# Patient Record
Sex: FEMALE | Race: WHITE | NOT HISPANIC OR LATINO
[De-identification: names, ages, dates, MRNs, and addresses within clinical notes are randomized per-mention and may not be internally consistent; named-entity substitution may affect disease eponyms.]

---

## 2017-03-19 ENCOUNTER — RESULT REVIEW (OUTPATIENT)
Age: 62
End: 2017-03-19

## 2017-11-28 ENCOUNTER — APPOINTMENT (OUTPATIENT)
Dept: MAMMOGRAPHY | Facility: CLINIC | Age: 62
End: 2017-11-28
Payer: COMMERCIAL

## 2017-11-28 ENCOUNTER — APPOINTMENT (OUTPATIENT)
Dept: ULTRASOUND IMAGING | Facility: CLINIC | Age: 62
End: 2017-11-28
Payer: COMMERCIAL

## 2017-11-28 ENCOUNTER — OUTPATIENT (OUTPATIENT)
Dept: OUTPATIENT SERVICES | Facility: HOSPITAL | Age: 62
LOS: 1 days | End: 2017-11-28
Payer: COMMERCIAL

## 2017-11-28 DIAGNOSIS — Z12.31 ENCOUNTER FOR SCREENING MAMMOGRAM FOR MALIGNANT NEOPLASM OF BREAST: ICD-10-CM

## 2017-11-28 PROCEDURE — G0202: CPT | Mod: 26

## 2017-11-28 PROCEDURE — 77063 BREAST TOMOSYNTHESIS BI: CPT

## 2017-11-28 PROCEDURE — 76641 ULTRASOUND BREAST COMPLETE: CPT | Mod: 26,50

## 2017-11-28 PROCEDURE — 77063 BREAST TOMOSYNTHESIS BI: CPT | Mod: 26

## 2017-11-28 PROCEDURE — 76641 ULTRASOUND BREAST COMPLETE: CPT

## 2017-11-28 PROCEDURE — 77067 SCR MAMMO BI INCL CAD: CPT

## 2018-04-12 ENCOUNTER — RESULT REVIEW (OUTPATIENT)
Age: 63
End: 2018-04-12

## 2018-11-30 ENCOUNTER — OUTPATIENT (OUTPATIENT)
Dept: OUTPATIENT SERVICES | Facility: HOSPITAL | Age: 63
LOS: 1 days | End: 2018-11-30
Payer: COMMERCIAL

## 2018-11-30 ENCOUNTER — APPOINTMENT (OUTPATIENT)
Dept: MAMMOGRAPHY | Facility: CLINIC | Age: 63
End: 2018-11-30
Payer: COMMERCIAL

## 2018-11-30 ENCOUNTER — APPOINTMENT (OUTPATIENT)
Dept: ULTRASOUND IMAGING | Facility: CLINIC | Age: 63
End: 2018-11-30
Payer: COMMERCIAL

## 2018-11-30 DIAGNOSIS — Z00.8 ENCOUNTER FOR OTHER GENERAL EXAMINATION: ICD-10-CM

## 2018-11-30 PROCEDURE — 77066 DX MAMMO INCL CAD BI: CPT | Mod: 26

## 2018-11-30 PROCEDURE — G0279: CPT | Mod: 26

## 2018-11-30 PROCEDURE — 77063 BREAST TOMOSYNTHESIS BI: CPT

## 2018-11-30 PROCEDURE — 76641 ULTRASOUND BREAST COMPLETE: CPT | Mod: 26,50

## 2018-11-30 PROCEDURE — 77067 SCR MAMMO BI INCL CAD: CPT

## 2018-11-30 PROCEDURE — 76641 ULTRASOUND BREAST COMPLETE: CPT

## 2018-11-30 PROCEDURE — 77066 DX MAMMO INCL CAD BI: CPT

## 2018-11-30 PROCEDURE — G0279: CPT

## 2019-08-29 ENCOUNTER — APPOINTMENT (OUTPATIENT)
Dept: OBGYN | Facility: CLINIC | Age: 64
End: 2019-08-29
Payer: MEDICARE

## 2019-08-29 VITALS
WEIGHT: 130 LBS | DIASTOLIC BLOOD PRESSURE: 80 MMHG | HEIGHT: 65 IN | SYSTOLIC BLOOD PRESSURE: 120 MMHG | BODY MASS INDEX: 21.66 KG/M2

## 2019-08-29 DIAGNOSIS — N94.9 UNSPECIFIED CONDITION ASSOCIATED WITH FEMALE GENITAL ORGANS AND MENSTRUAL CYCLE: ICD-10-CM

## 2019-08-29 DIAGNOSIS — L29.0 PRURITUS ANI: ICD-10-CM

## 2019-08-29 PROCEDURE — 99214 OFFICE O/P EST MOD 30 MIN: CPT

## 2019-08-29 PROCEDURE — 81003 URINALYSIS AUTO W/O SCOPE: CPT | Mod: QW

## 2019-08-29 NOTE — DISCUSSION/SUMMARY
[FreeTextEntry1] : F/U affirm cx.\par \par Will prescribe lotrisone for external vulvar and rectal itching and terconazole for internal burning.

## 2019-08-29 NOTE — REVIEW OF SYSTEMS
[Nl] : Integumentary [Chest Pain] : no chest pain [Dyspnea] : no shortness of breath [Vomiting] : no vomiting [Abdominal Pain] : no abdominal pain [Vaginal Pain] : vaginal pain [Vaginal Discharge] : no vaginal discharge [Pelvic Pain] : no pelvic pain [Vaginal Itching] : no vaginal itching [Vulvar Itching] : vulvar itching

## 2019-08-29 NOTE — PHYSICAL EXAM
[Vulvitis] : vulvitis [Labia Majora] : labia major [Labia Minora] : labia minora [Discharge] : a  ~M vaginal discharge was present [Atrophy] : atrophy [Scant] : scant [de-identified] : perirectal erythema as well [No Bleeding] : there was no active vaginal bleeding

## 2019-08-30 LAB
BILIRUB UR QL STRIP: NORMAL
GLUCOSE UR-MCNC: NORMAL
HCG UR QL: 0.2 EU/DL
HGB UR QL STRIP.AUTO: NORMAL
KETONES UR-MCNC: NORMAL
LEUKOCYTE ESTERASE UR QL STRIP: NORMAL
NITRITE UR QL STRIP: NORMAL
PH UR STRIP: 7
PROT UR STRIP-MCNC: NORMAL
SP GR UR STRIP: 1.01

## 2019-08-31 LAB
CANDIDA VAG CYTO: NOT DETECTED
G VAGINALIS+PREV SP MTYP VAG QL MICRO: NOT DETECTED
T VAGINALIS VAG QL WET PREP: NOT DETECTED

## 2019-09-19 ENCOUNTER — RECORD ABSTRACTING (OUTPATIENT)
Age: 64
End: 2019-09-19

## 2019-09-19 DIAGNOSIS — R68.82 DECREASED LIBIDO: ICD-10-CM

## 2019-09-19 DIAGNOSIS — R35.1 NOCTURIA: ICD-10-CM

## 2019-09-19 DIAGNOSIS — Z86.39 PERSONAL HISTORY OF OTHER ENDOCRINE, NUTRITIONAL AND METABOLIC DISEASE: ICD-10-CM

## 2019-09-19 DIAGNOSIS — Z78.9 OTHER SPECIFIED HEALTH STATUS: ICD-10-CM

## 2019-09-19 DIAGNOSIS — E03.9 HYPOTHYROIDISM, UNSPECIFIED: ICD-10-CM

## 2019-09-19 DIAGNOSIS — D06.9 CARCINOMA IN SITU OF CERVIX, UNSPECIFIED: ICD-10-CM

## 2019-09-19 DIAGNOSIS — F41.9 ANXIETY DISORDER, UNSPECIFIED: ICD-10-CM

## 2019-09-19 DIAGNOSIS — Z92.89 PERSONAL HISTORY OF OTHER MEDICAL TREATMENT: ICD-10-CM

## 2019-09-19 DIAGNOSIS — E78.00 PURE HYPERCHOLESTEROLEMIA, UNSPECIFIED: ICD-10-CM

## 2019-09-19 DIAGNOSIS — I10 ESSENTIAL (PRIMARY) HYPERTENSION: ICD-10-CM

## 2019-09-19 DIAGNOSIS — N64.59 OTHER SIGNS AND SYMPTOMS IN BREAST: ICD-10-CM

## 2019-09-19 DIAGNOSIS — M06.9 RHEUMATOID ARTHRITIS, UNSPECIFIED: ICD-10-CM

## 2019-09-19 DIAGNOSIS — N89.8 OTHER SPECIFIED NONINFLAMMATORY DISORDERS OF VAGINA: ICD-10-CM

## 2019-09-19 DIAGNOSIS — M85.80 OTHER SPECIFIED DISORDERS OF BONE DENSITY AND STRUCTURE, UNSPECIFIED SITE: ICD-10-CM

## 2019-09-19 DIAGNOSIS — Z87.39 PERSONAL HISTORY OF OTHER DISEASES OF THE MUSCULOSKELETAL SYSTEM AND CONNECTIVE TISSUE: ICD-10-CM

## 2019-09-19 DIAGNOSIS — Z87.448 PERSONAL HISTORY OF OTHER DISEASES OF URINARY SYSTEM: ICD-10-CM

## 2019-09-19 LAB — CYTOLOGY CVX/VAG DOC THIN PREP: NORMAL

## 2019-09-19 RX ORDER — ZOLPIDEM TARTRATE 10 MG/1
10 TABLET, FILM COATED ORAL
Refills: 0 | Status: ACTIVE | COMMUNITY

## 2019-10-16 ENCOUNTER — APPOINTMENT (OUTPATIENT)
Dept: OBGYN | Facility: CLINIC | Age: 64
End: 2019-10-16
Payer: MEDICARE

## 2019-10-16 VITALS
BODY MASS INDEX: 20.89 KG/M2 | SYSTOLIC BLOOD PRESSURE: 124 MMHG | DIASTOLIC BLOOD PRESSURE: 72 MMHG | HEIGHT: 66 IN | WEIGHT: 130 LBS

## 2019-10-16 DIAGNOSIS — Z12.31 ENCOUNTER FOR SCREENING MAMMOGRAM FOR MALIGNANT NEOPLASM OF BREAST: ICD-10-CM

## 2019-10-16 LAB
BILIRUB UR QL STRIP: NORMAL
DATE COLLECTED: NORMAL
GLUCOSE UR-MCNC: NORMAL
HCG UR QL: 0.2 EU/DL
HEMOCCULT SP1 STL QL: NEGATIVE
HGB UR QL STRIP.AUTO: NORMAL
KETONES UR-MCNC: NORMAL
LEUKOCYTE ESTERASE UR QL STRIP: NORMAL
NITRITE UR QL STRIP: NORMAL
PH UR STRIP: 5.5
PROT UR STRIP-MCNC: NORMAL
QUALITY CONTROL: NORMAL
SP GR UR STRIP: 1.01

## 2019-10-16 PROCEDURE — 81003 URINALYSIS AUTO W/O SCOPE: CPT | Mod: QW

## 2019-10-16 PROCEDURE — 82270 OCCULT BLOOD FECES: CPT

## 2019-10-16 PROCEDURE — G0101: CPT

## 2019-10-16 NOTE — REVIEW OF SYSTEMS
[Dysuria] : dysuria [Nocturia] : nocturia [Nl] : Integumentary [Fever] : no fever [Chills] : no chills [Pelvic Pain] : no pelvic pain [Abn Vag Bleeding] : no abnormal vaginal bleeding [Vulvar Itching] : no vulvar itching [FreeTextEntry2] : Vaginal Dryness

## 2019-10-16 NOTE — HISTORY OF PRESENT ILLNESS
[Last Mammogram ___] : Last Mammogram was [unfilled] [Last Bone Density ___] : Last bone density studies [unfilled] [Last Pap ___] : Last cervical pap smear was [unfilled] [unknown] : the patient is unsure of the date of her LMP [Menarche Age: ____] : age at menarche was [unfilled] [Sexually Active] : is not sexually active

## 2019-10-16 NOTE — PHYSICAL EXAM
[Awake] : awake [Alert] : alert [Breast Nipple Inversion] : inverted [Soft] : soft [Oriented x3] : oriented to person, place, and time [Normal] : external genitalia [Labia Majora] : labia major [Labia Minora] : labia minora [Atrophy] : atrophy [No Bleeding] : there was no active vaginal bleeding [Absent] : absent [No Tenderness] : no rectal tenderness [Acute Distress] : no acute distress [Mass] : no breast mass [Nipple Discharge] : no nipple discharge [Axillary LAD] : no axillary lymphadenopathy [Tender] : non tender [Distended] : not distended [Depressed Mood] : not depressed [Flat Affect] : affect not flat [Tenderness] : nontender [Adnexa Tenderness] : were not tender [Occult Blood] : occult blood test from digital rectal exam was negative

## 2019-10-17 LAB — HPV HIGH+LOW RISK DNA PNL CVX: NOT DETECTED

## 2019-10-21 LAB — CYTOLOGY CVX/VAG DOC THIN PREP: ABNORMAL

## 2020-10-06 ENCOUNTER — APPOINTMENT (OUTPATIENT)
Dept: OBGYN | Facility: CLINIC | Age: 65
End: 2020-10-06
Payer: MEDICARE

## 2020-10-06 VITALS
WEIGHT: 130 LBS | TEMPERATURE: 97.3 F | BODY MASS INDEX: 21.66 KG/M2 | SYSTOLIC BLOOD PRESSURE: 150 MMHG | DIASTOLIC BLOOD PRESSURE: 90 MMHG | HEIGHT: 65 IN

## 2020-10-06 DIAGNOSIS — R31.29 OTHER MICROSCOPIC HEMATURIA: ICD-10-CM

## 2020-10-06 PROCEDURE — 99213 OFFICE O/P EST LOW 20 MIN: CPT

## 2020-10-06 PROCEDURE — 81003 URINALYSIS AUTO W/O SCOPE: CPT | Mod: QW

## 2020-10-06 RX ORDER — TERCONAZOLE 8 MG/G
0.8 CREAM VAGINAL
Qty: 1 | Refills: 0 | Status: DISCONTINUED | COMMUNITY
Start: 2019-08-29 | End: 2020-10-06

## 2020-10-06 RX ORDER — LACTOBACILLUS COMBINATION NO.4 3B CELL
CAPSULE ORAL
Refills: 0 | Status: DISCONTINUED | COMMUNITY
End: 2020-10-06

## 2020-10-06 RX ORDER — FLAXSEED OIL 1000 MG
CAPSULE ORAL
Refills: 0 | Status: DISCONTINUED | COMMUNITY
End: 2020-10-06

## 2020-10-06 NOTE — PHYSICAL EXAM
[Appropriately responsive] : appropriately responsive [Alert] : alert [No Acute Distress] : no acute distress [No Lymphadenopathy] : no lymphadenopathy [Soft] : soft [Non-tender] : non-tender [Non-distended] : non-distended [No HSM] : No HSM [No Lesions] : no lesions [No Mass] : no mass [Oriented x3] : oriented x3 [Labia Majora] : normal [Labia Minora] : normal [Normal] : normal [Atrophy] : atrophy [Dry Mucosa] : dry mucosa [No Bleeding] : There was no active vaginal bleeding [Absent] : absent [Uterine Adnexae] : absent

## 2020-10-08 LAB
APPEARANCE: CLEAR
BACTERIA: NEGATIVE
BILIRUB UR QL STRIP: NORMAL
BILIRUBIN URINE: NEGATIVE
BLOOD URINE: NEGATIVE
COLOR: NORMAL
GLUCOSE QUALITATIVE U: NEGATIVE
GLUCOSE UR-MCNC: NORMAL
HCG UR QL: 0.2 EU/DL
HGB UR QL STRIP.AUTO: ABNORMAL
HYALINE CASTS: 0 /LPF
KETONES UR-MCNC: NORMAL
KETONES URINE: NEGATIVE
LEUKOCYTE ESTERASE UR QL STRIP: ABNORMAL
LEUKOCYTE ESTERASE URINE: NEGATIVE
MICROSCOPIC-UA: NORMAL
NITRITE UR QL STRIP: NORMAL
NITRITE URINE: NEGATIVE
PH UR STRIP: 7
PH URINE: 7
PROT UR STRIP-MCNC: NORMAL
PROTEIN URINE: NEGATIVE
RED BLOOD CELLS URINE: 0 /HPF
SP GR UR STRIP: 1.02
SPECIFIC GRAVITY URINE: 1.01
SQUAMOUS EPITHELIAL CELLS: 0 /HPF
UROBILINOGEN URINE: NORMAL
WHITE BLOOD CELLS URINE: 0 /HPF

## 2020-10-14 LAB — BACTERIA UR CULT: NORMAL

## 2020-10-20 ENCOUNTER — APPOINTMENT (OUTPATIENT)
Dept: OBGYN | Facility: CLINIC | Age: 65
End: 2020-10-20
Payer: MEDICARE

## 2020-10-20 VITALS
BODY MASS INDEX: 21.66 KG/M2 | DIASTOLIC BLOOD PRESSURE: 76 MMHG | SYSTOLIC BLOOD PRESSURE: 128 MMHG | TEMPERATURE: 97.2 F | HEIGHT: 65 IN | WEIGHT: 130 LBS

## 2020-10-20 DIAGNOSIS — Z12.31 ENCOUNTER FOR SCREENING MAMMOGRAM FOR MALIGNANT NEOPLASM OF BREAST: ICD-10-CM

## 2020-10-20 DIAGNOSIS — Z87.891 PERSONAL HISTORY OF NICOTINE DEPENDENCE: ICD-10-CM

## 2020-10-20 DIAGNOSIS — Z78.9 OTHER SPECIFIED HEALTH STATUS: ICD-10-CM

## 2020-10-20 DIAGNOSIS — Z01.419 ENCOUNTER FOR GYNECOLOGICAL EXAMINATION (GENERAL) (ROUTINE) W/OUT ABNORMAL FINDINGS: ICD-10-CM

## 2020-10-20 DIAGNOSIS — R92.2 INCONCLUSIVE MAMMOGRAM: ICD-10-CM

## 2020-10-20 LAB
BILIRUB UR QL STRIP: NORMAL
GLUCOSE UR-MCNC: NORMAL
HCG UR QL: 0.2 EU/DL
HGB UR QL STRIP.AUTO: NORMAL
KETONES UR-MCNC: NORMAL
LEUKOCYTE ESTERASE UR QL STRIP: NORMAL
NITRITE UR QL STRIP: NORMAL
PH UR STRIP: 5.5
PROT UR STRIP-MCNC: NORMAL
SP GR UR STRIP: 1.01

## 2020-10-20 PROCEDURE — G0101: CPT

## 2020-10-20 PROCEDURE — 81003 URINALYSIS AUTO W/O SCOPE: CPT | Mod: QW

## 2020-10-20 PROCEDURE — 99397 PER PM REEVAL EST PAT 65+ YR: CPT | Mod: GY

## 2020-10-20 PROCEDURE — 82270 OCCULT BLOOD FECES: CPT

## 2020-10-20 RX ORDER — CLOTRIMAZOLE AND BETAMETHASONE DIPROPIONATE 10; .5 MG/G; MG/G
1-0.05 CREAM TOPICAL TWICE DAILY
Qty: 1 | Refills: 1 | Status: DISCONTINUED | COMMUNITY
Start: 2019-08-29 | End: 2020-10-20

## 2020-10-27 PROBLEM — Z87.891 FORMER SMOKER: Status: ACTIVE | Noted: 2020-10-20

## 2020-10-27 PROBLEM — Z78.9 EXERCISES DAILY: Status: ACTIVE | Noted: 2020-10-20

## 2020-10-27 LAB
CYTOLOGY CVX/VAG DOC THIN PREP: ABNORMAL
HPV HIGH+LOW RISK DNA PNL CVX: NOT DETECTED

## 2020-10-28 NOTE — END OF VISIT
[FreeTextEntry3] : I, Melvin Renteria, acted solely as a scribe for Dr. Sierra on this date 10/20/2020.\par All medical record entries made by the Scribe were at my, Dr. Sierra's direction and personally dictated by me on  10/20/2020. I have reviewed the chart and agree that the record accurately reflects my personal performance of the history, physical exam, assessment and plan. I have also personally directed, reviewed, and agreed with the chart.\par

## 2020-10-28 NOTE — DISCUSSION/SUMMARY
[FreeTextEntry1] : - Annual well woman visit done today. Vaginal pap collected today due h/o HSIL. Patient declines STI testing.\par \par - Cystocele- Management options were reviewed includin) Expectant management, 2) Pessary, 3) Surgical intervention. She desires expectant management. Recommended that she follow up with Urology or Urogyn for urinary frequency symptoms (with negative urine culture).\par \par - Bilateral breast pain x 1 year- On exam, generalized tenderness to palpation in bilateral breasts. No signs of infection. Rx for mammogram and breast ultrasound were given. Recommended using a supportive bra, massage, NSAIDs PRN and using warm compresses. Referral for breast specialist was also given due to chronic breast pain.\par \par - Osteopenia- Patient states her last DEXA scan was in 2018. Rx DEXA ordered. She is following with Rheumatology. Recommended taking Vitamin D, Calcium, and continuing exercises.\par \par - Recommend following up with dermatology for annual skin surveillance.\par \par - Follow up in 1 year for her annual GYN exam or PRN.

## 2020-10-28 NOTE — PHYSICAL EXAM
[Appropriately responsive] : appropriately responsive [Alert] : alert [No Acute Distress] : no acute distress [Soft] : soft [Non-tender] : non-tender [Non-distended] : non-distended [No Lesions] : no lesions [Oriented x3] : oriented x3 [No Mass] : no mass [Examination Of The Breasts] : a normal appearance [No Masses] : no breast masses were palpable [Normal] : normal external genitalia [Labia Majora] : normal [Labia Minora] : normal [Atrophy] : atrophy [Cystocele] : a cystocele [Pink Rugae] : pink rugae [Absent] : absent [Uterine Adnexae] : absent [No Discharge] : no discharge [Tenderness Of The Right Breast] : tenderness [No Bleeding] : There was no active vaginal bleeding [No Tenderness] : no tenderness [Tenderness Of The Left Breast] : tenderness [FreeTextEntry4] : Vaginal cuff noted. [FreeTextEntry9] : FOBT negative

## 2020-10-28 NOTE — HISTORY OF PRESENT ILLNESS
[Patient reported PAP Smear was normal] : Patient reported PAP Smear was normal [LMP unknown] : LMP unknown [postmenopausal] : postmenopausal [N] : Patient reports normal menses [Y] : Positive pregnancy history [unknown] : Patient is unsure of the date of her LMP [Menarche Age: ____] : age at menarche was [unfilled] [No] : Patient does not have concerns regarding sex [Currently Active] : currently active [Men] : men [Condoms] : Condoms [Yes] : Yes [FreeTextEntry1] : Ms. Nicholson is presents for her annual well woman visit. \par \par She is doing well without any complaints. She denies having any abdominal pain or vaginal discharge. \par \par She has a history of hysterectomy. LMP was about 15 years ago. She denies having any postmenopausal vaginal bleeding.\par \par She desires a breast examination. She has chronic bilateral breast pain x 1 year.\par \par She has a history of osteopenia and has been recommended for Prolia injections by her rheumatologist. She is scheduled to see her rheumatologist this week. [Mammogramdate] : 11/05/2019 [TextBox_19] : B2 [BreastSonogramDate] : 11/05/2019 [TextBox_25] : B2 [PapSmeardate] : 10/16/2019 [PGHxTotal] : 2 [[facility] : 2 [[facility] : 2

## 2020-10-31 NOTE — DISCUSSION/SUMMARY
[FreeTextEntry1] : -Mild cystocele - Management options were discussed including pessary vs surgical intervention vs observation moving forward. Patient elects for observation as she is asymptomatic at this time. Contact information for urogynecology specialist given should patient consider surgical intervention in the future.\par \par -Urinalysis ordered - urine culture today significant for trace of hematuria.\par \par -Patient is due for her annual gyn exam on 10/16/20.\par \par -All questions and concerns addressed.

## 2020-10-31 NOTE — HISTORY OF PRESENT ILLNESS
[Patient reported PAP Smear was normal] : Patient reported PAP Smear was normal [LMP unknown] : LMP unknown [postmenopausal] : postmenopausal [N] : Patient is not sexually active [Y] : Positive pregnancy history [unknown] : Patient is unsure of the date of her LMP [No] : Patient does not have concerns regarding sex [Previously active] : previously active [FreeTextEntry1] : Ms. Nicholson is here for a follow up. She complaints that her bladder has been dropping for the past 2 months and is unable to move it back. She can feel it when she wipes, but states that it has not protruded out of the vagina. \par \par She reports urinary urgency and frequency, which she states is normal for her. She denies pain or known hematuria. Normal bowel movements.\par \par She has a history of hysterectomy and does not remember when her last menstrual period was. She is doing well without any complaints. She denies having any abdominal pain, vaginal bleeding, or vaginal discharge. \par \par She reports being in the hospital from July 7-10th for decreased sodium level. She was given IV fluids and followed up with her primary care. [Mammogramdate] : 11/5/2019 [TextBox_19] : B2 [BreastSonogramDate] : 11/05/2019 [TextBox_25] : B2 [PapSmeardate] : 10/16/2019 [PGHxTotal] : 2 [[facility] : 2 [[facility]] : 2

## 2020-10-31 NOTE — END OF VISIT
[FreeTextEntry3] : I, Loretta Duque, solely acted as scribe for Dr. Tessie Sierra on 10/06/2020.\par All medical entries made by the Scribe were at my, Dr. Sierra's, direction and personally dictated by me on 10/06/2020. I have reviewed the chart and agree that the record accurately reflects my personal performance of the history, physical exam, assessment and plan. I have also personally directed, reviewed, and agreed with the chart.

## 2020-12-21 PROBLEM — Z12.31 ENCOUNTER FOR SCREENING MAMMOGRAM FOR BREAST CANCER: Status: RESOLVED | Noted: 2019-10-16 | Resolved: 2020-12-21

## 2020-12-23 PROBLEM — Z01.419 ENCOUNTER FOR ANNUAL ROUTINE GYNECOLOGICAL EXAMINATION: Status: RESOLVED | Noted: 2020-10-20 | Resolved: 2020-12-23

## 2021-04-29 ENCOUNTER — APPOINTMENT (OUTPATIENT)
Dept: UROGYNECOLOGY | Facility: CLINIC | Age: 66
End: 2021-04-29

## 2021-05-13 ENCOUNTER — APPOINTMENT (OUTPATIENT)
Dept: UROGYNECOLOGY | Facility: CLINIC | Age: 66
End: 2021-05-13
Payer: MEDICARE

## 2021-05-13 ENCOUNTER — RESULT CHARGE (OUTPATIENT)
Age: 66
End: 2021-05-13

## 2021-05-13 DIAGNOSIS — R35.0 FREQUENCY OF MICTURITION: ICD-10-CM

## 2021-05-13 DIAGNOSIS — K46.9 UNSPECIFIED ABDOMINAL HERNIA W/OUT OBSTRUCTION OR GANGRENE: ICD-10-CM

## 2021-05-13 DIAGNOSIS — Z86.018 PERSONAL HISTORY OF OTHER BENIGN NEOPLASM: ICD-10-CM

## 2021-05-13 DIAGNOSIS — K59.00 CONSTIPATION, UNSPECIFIED: ICD-10-CM

## 2021-05-13 DIAGNOSIS — Z87.01 PERSONAL HISTORY OF PNEUMONIA (RECURRENT): ICD-10-CM

## 2021-05-13 DIAGNOSIS — Z87.828 PERSONAL HISTORY OF OTHER (HEALED) PHYSICAL INJURY AND TRAUMA: ICD-10-CM

## 2021-05-13 DIAGNOSIS — Z86.79 PERSONAL HISTORY OF OTHER DISEASES OF THE CIRCULATORY SYSTEM: ICD-10-CM

## 2021-05-13 DIAGNOSIS — Z84.1 FAMILY HISTORY OF DISORDERS OF KIDNEY AND URETER: ICD-10-CM

## 2021-05-13 DIAGNOSIS — Z87.09 PERSONAL HISTORY OF OTHER DISEASES OF THE RESPIRATORY SYSTEM: ICD-10-CM

## 2021-05-13 DIAGNOSIS — N36.41 HYPERMOBILITY OF URETHRA: ICD-10-CM

## 2021-05-13 DIAGNOSIS — Z60.2 PROBLEMS RELATED TO LIVING ALONE: ICD-10-CM

## 2021-05-13 DIAGNOSIS — R39.15 URGENCY OF URINATION: ICD-10-CM

## 2021-05-13 DIAGNOSIS — N81.9 FEMALE GENITAL PROLAPSE, UNSPECIFIED: ICD-10-CM

## 2021-05-13 DIAGNOSIS — R32 UNSPECIFIED URINARY INCONTINENCE: ICD-10-CM

## 2021-05-13 DIAGNOSIS — Z86.39 PERSONAL HISTORY OF OTHER ENDOCRINE, NUTRITIONAL AND METABOLIC DISEASE: ICD-10-CM

## 2021-05-13 DIAGNOSIS — Z79.52 LONG TERM (CURRENT) USE OF SYSTEMIC STEROIDS: ICD-10-CM

## 2021-05-13 LAB
BILIRUB UR QL STRIP: NEGATIVE
CLARITY UR: CLEAR
COLLECTION METHOD: NORMAL
GLUCOSE UR-MCNC: NEGATIVE
HCG UR QL: 0.2 EU/DL
HGB UR QL STRIP.AUTO: NEGATIVE
KETONES UR-MCNC: NEGATIVE
LEUKOCYTE ESTERASE UR QL STRIP: NORMAL
NITRITE UR QL STRIP: NEGATIVE
PH UR STRIP: 5.5
PROT UR STRIP-MCNC: NEGATIVE
SP GR UR STRIP: 1.01

## 2021-05-13 PROCEDURE — 99205 OFFICE O/P NEW HI 60 MIN: CPT | Mod: 25

## 2021-05-13 PROCEDURE — 51701 INSERT BLADDER CATHETER: CPT

## 2021-05-13 PROCEDURE — 81002 URINALYSIS NONAUTO W/O SCOPE: CPT

## 2021-05-13 RX ORDER — CALCIUM/MAG/VITAMIN D2/ZN/MIN
SUSPENSION, ORAL (FINAL DOSE FORM) ORAL
Refills: 0 | Status: ACTIVE | COMMUNITY

## 2021-05-13 SDOH — SOCIAL STABILITY - SOCIAL INSECURITY: PROBLEMS RELATED TO LIVING ALONE: Z60.2

## 2021-05-13 NOTE — DISCUSSION/SUMMARY
[FreeTextEntry1] : \par Zuri presents with vault prolapse, neg CST, PVR 100cc. Stage II prolapse on exam. Visual illustrations were used to demonstrate prolapse. We reviewed management options for her prolapse including: observation, pelvic floor exercises with and without physical therapy, pessary, and surgical management. We reviewed the different types of surgical procedures including abdominal, vaginal, and robotic/laparoscopic procedures. Mesh and non-mesh options were reviewed. IUGA information on prolapse was given to her. All questions were answered.\par \par [] u/a cx\par [] PFE, return in 2 months \par

## 2021-05-13 NOTE — PROCEDURE
[Straight Catheterization] : insertion of a straight catheter [Stress Incontinence] : stress incontinence [___ Fr Straight Tip] : a [unfilled] in [place] straight tip catheter [None] : there were no complications with the catheter insertion [No Complications] : no complications [Tolerated Well] : the patient tolerated the procedure well [Post procedure instructions and information given] : Post procedure instructions and information were given and reviewed with patient. [0] : 0

## 2021-05-13 NOTE — HISTORY OF PRESENT ILLNESS
[Cystocele (Obstetric)] : no [Vaginal Wall Prolapse] : no [Rectal Prolapse] : no [Unable To Restrain Bowel Movement] : no [Urinary Tract Infection] : no [x3+] : nocturia three or more  times a night [Urinary Frequency] : no [Feelings Of Urinary Urgency] : no [Pain During Urination (Dysuria)] : no [Constipation Obstructed Defecation] : moderate [Incomplete Emptying Of Stool] : no [Stool Visible Blood] : no [Pelvic Pain] : no [Vaginal Pain] : no [Vulvar Pain] : no [] : no [FreeTextEntry1] :  \par 64 y/o presents with c/o of vaginal bulge, she reports at night she feels worsening vaginal bulge, denies leakage of urine, no vaginal bleeding, reports she is unable to walk with the bulge, reports frequency, urgency, nocturia X 3, denies incomplete emptying, sometimes has intermittent stream, denies  UTIs, denies pain, reports constipation, not sexually active \par \par \par she reports major health issues- she has an issue with her sodium level, noone can give her an answer \par \par PSH: hysterectomy for ovarian cyst

## 2021-05-13 NOTE — PHYSICAL EXAM
[Chaperone Present] : A chaperone was present in the examining room during all aspects of the physical examination [No Acute Distress] : in no acute distress [Well developed] : well developed [Well Nourished] : ~L well nourished [Oriented x3] : oriented to person, place, and time [Normal Memory] : ~T memory was ~L unimpaired [Normal Mood/Affect] : mood and affect are normal [Cough] : no cough [No Edema] : ~T edema was not present [Tenderness] : ~T no ~M abdominal tenderness observed [Distended] : not distended [Scar] : a scar was noted [Inguinal LAD] : no adenopathy was noted in the inguinal lymph nodes [Warm and Dry] : was warm and dry to touch [Normal Gait] : gait was normal [Labia Majora] : were normal [Labia Minora] : were normal [Atrophy] : atrophy [No Bleeding] : there was no active vaginal bleeding [3] : 3 [Aa ____] : Aa [unfilled] [Ba ____] : Ba [unfilled] [C ____] : C [unfilled] [GH ____] : GH [unfilled] [PB ____] : PB [unfilled] [TVL ____] : TVL  [unfilled] [Ap ____] : Ap [unfilled] [Bp ____] : Bp [unfilled] [D ____] : D [unfilled] [Absent] : absent [Normal] : no abnormalities [Post Void Residual ____ml] : post void residual was [unfilled] ml [FreeTextEntry3] : neg CST

## 2021-05-14 LAB
APPEARANCE: CLEAR
BACTERIA: NEGATIVE
BILIRUBIN URINE: NEGATIVE
BLOOD URINE: NEGATIVE
COLOR: NORMAL
GLUCOSE QUALITATIVE U: NEGATIVE
HYALINE CASTS: 2 /LPF
KETONES URINE: NEGATIVE
LEUKOCYTE ESTERASE URINE: NEGATIVE
MICROSCOPIC-UA: NORMAL
NITRITE URINE: NEGATIVE
PH URINE: 6
PROTEIN URINE: NEGATIVE
RED BLOOD CELLS URINE: 1 /HPF
SPECIFIC GRAVITY URINE: 1.01
SQUAMOUS EPITHELIAL CELLS: 0 /HPF
UROBILINOGEN URINE: NORMAL
WHITE BLOOD CELLS URINE: 0 /HPF

## 2021-05-17 LAB — BACTERIA UR CULT: NORMAL

## 2021-07-13 ENCOUNTER — APPOINTMENT (OUTPATIENT)
Age: 66
End: 2021-07-13

## 2022-07-13 ENCOUNTER — NON-APPOINTMENT (OUTPATIENT)
Age: 67
End: 2022-07-13

## 2022-07-13 ENCOUNTER — APPOINTMENT (OUTPATIENT)
Dept: OBGYN | Facility: CLINIC | Age: 67
End: 2022-07-13

## 2022-07-13 VITALS
BODY MASS INDEX: 20.99 KG/M2 | SYSTOLIC BLOOD PRESSURE: 112 MMHG | TEMPERATURE: 97.5 F | WEIGHT: 126 LBS | DIASTOLIC BLOOD PRESSURE: 78 MMHG | HEIGHT: 65 IN

## 2022-07-13 DIAGNOSIS — Z12.39 ENCOUNTER FOR OTHER SCREENING FOR MALIGNANT NEOPLASM OF BREAST: ICD-10-CM

## 2022-07-13 DIAGNOSIS — Z01.419 ENCOUNTER FOR GYNECOLOGICAL EXAMINATION (GENERAL) (ROUTINE) W/OUT ABNORMAL FINDINGS: ICD-10-CM

## 2022-07-13 PROCEDURE — 81003 URINALYSIS AUTO W/O SCOPE: CPT | Mod: QW

## 2022-07-13 PROCEDURE — G0101: CPT

## 2022-07-13 PROCEDURE — 99397 PER PM REEVAL EST PAT 65+ YR: CPT | Mod: GY

## 2022-07-13 RX ORDER — LEVOTHYROXINE SODIUM 0.11 MG/1
112 TABLET ORAL
Qty: 90 | Refills: 0 | Status: ACTIVE | COMMUNITY
Start: 2021-10-16

## 2022-07-13 RX ORDER — METOPROLOL TARTRATE 75 MG/1
TABLET, FILM COATED ORAL
Refills: 0 | Status: DISCONTINUED | COMMUNITY
End: 2022-07-13

## 2022-07-13 RX ORDER — AMLODIPINE BESYLATE 5 MG/1
TABLET ORAL
Refills: 0 | Status: DISCONTINUED | COMMUNITY
End: 2022-07-13

## 2022-07-13 RX ORDER — CHROMIUM 200 MCG
TABLET ORAL
Refills: 0 | Status: DISCONTINUED | COMMUNITY
End: 2022-07-13

## 2022-07-13 RX ORDER — LISINOPRIL 5 MG/1
5 TABLET ORAL
Qty: 90 | Refills: 0 | Status: ACTIVE | COMMUNITY
Start: 2022-03-08

## 2022-07-13 RX ORDER — ALPRAZOLAM 0.25 MG/1
0.25 TABLET ORAL
Qty: 15 | Refills: 0 | Status: ACTIVE | COMMUNITY
Start: 2022-03-15

## 2022-07-13 RX ORDER — METHOTREXATE 2.5 MG/1
2.5 TABLET ORAL
Refills: 0 | Status: DISCONTINUED | COMMUNITY
End: 2022-07-13

## 2022-07-13 RX ORDER — ROSUVASTATIN CALCIUM 10 MG/1
10 TABLET, FILM COATED ORAL
Qty: 90 | Refills: 0 | Status: ACTIVE | COMMUNITY
Start: 2022-05-18

## 2022-07-13 NOTE — PLAN
[FreeTextEntry1] : - mammo rx provided\par - remedies for breast tenderness discussed, specifically decreasing caffeine, and adding vit E, as well as changing to a new bra without an underwire\par - vaginal pap obtained\par - pt recommended to f/u with urogyn should she decide to treat the bladder prolapse.\par - regular exercise encouraged- particularly weight bearing for bone health, pt stopped treatment for osteoporosis, but continues to follow with the rheumatologist.

## 2022-07-13 NOTE — REVIEW OF SYSTEMS
[Patient Intake Form Reviewed] : Patient intake form was reviewed [Breast Pain] : breast pain [Negative] : Heme/Lymph

## 2022-07-13 NOTE — HISTORY OF PRESENT ILLNESS
[LMP unknown] : LMP unknown [postmenopausal] : postmenopausal [N] : Patient is not sexually active [Y] : Positive pregnancy history [unknown] : Patient is unsure of the date of her LMP [Menarche Age: ____] : age at menarche was [unfilled] [No] : Patient does not have concerns regarding sex [Previously active] : previously active [TextBox_4] : Zuri is here for an annual exam.\par \par she reports bladder prolapse - has seen urogyn, but was not ready to have surgery, so has been dealing with it.  She denies urinary incontinence.\par \par s/p total hysterectomy, history of cervical dysplasia. [Mammogramdate] : 01/06/22 [TextBox_19] : BR2 [PapSmeardate] : 10/20/20 [TextBox_31] : NEG [BoneDensityDate] : 10/12/21 [TextBox_37] : OSTEOPOROSIS [HPVDate] : 10/20/20 [TextBox_78] : NEG [PGHxTotal] : 2 [[facility]] : 2 [[facility]xLiving] : 2

## 2022-07-13 NOTE — PHYSICAL EXAM
[Chaperone Present] : A chaperone was present in the examining room during all aspects of the physical examination [Appropriately responsive] : appropriately responsive [Alert] : alert [No Acute Distress] : no acute distress [Soft] : soft [Non-tender] : non-tender [Non-distended] : non-distended [No Lesions] : no lesions [No Mass] : no mass [Oriented x3] : oriented x3 [Examination Of The Breasts] : a normal appearance [No Masses] : no breast masses were palpable [Normal] : normal [Atrophy] : atrophy [No Bleeding] : There was no active vaginal bleeding [Absent] : absent [Uterine Adnexae] : non-palpable [Declined] : Patient declined rectal exam [FreeTextEntry1] : RAÚL Humphrey

## 2022-07-15 LAB — HPV HIGH+LOW RISK DNA PNL CVX: NOT DETECTED

## 2022-07-16 LAB — CYTOLOGY CVX/VAG DOC THIN PREP: ABNORMAL

## 2023-03-28 ENCOUNTER — OFFICE (OUTPATIENT)
Dept: URBAN - METROPOLITAN AREA CLINIC 105 | Facility: CLINIC | Age: 68
Setting detail: OPHTHALMOLOGY
End: 2023-03-28
Payer: MEDICARE

## 2023-03-28 DIAGNOSIS — Z79.899: ICD-10-CM

## 2023-03-28 PROCEDURE — 92014 COMPRE OPH EXAM EST PT 1/>: CPT | Performed by: OPTOMETRIST

## 2023-03-28 PROCEDURE — 92134 CPTRZ OPH DX IMG PST SGM RTA: CPT | Performed by: OPTOMETRIST

## 2023-03-28 PROCEDURE — 92083 EXTENDED VISUAL FIELD XM: CPT | Performed by: OPTOMETRIST

## 2023-03-28 ASSESSMENT — REFRACTION_MANIFEST
OD_VA1: 20/20-1
OS_ADD: +2.50
OD_ADD: +2.50
OS_CYLINDER: -0.25
OS_AXIS: 070
OD_SPHERE: PLANO
OS_SPHERE: +0.50
OD_AXIS: 140
OD_CYLINDER: -0.25
OS_VA1: 20/20

## 2023-03-28 ASSESSMENT — REFRACTION_CURRENTRX
OD_SPHERE: +2.00
OD_CYLINDER: -0.25
OS_OVR_VA: 20/
OS_SPHERE: +0.50
OD_ADD: +2.25
OD_VPRISM_DIRECTION: SV
OD_OVR_VA: 20/
OS_ADD: +2.25
OD_AXIS: 127
OD_SPHERE: PLANO
OS_SPHERE: +2.00
OS_OVR_VA: 20/
OS_CYLINDER: SPHERE
OD_CYLINDER: SPHERE
OS_CYLINDER: -0.25
OS_VPRISM_DIRECTION: SV
OS_AXIS: 080
OD_OVR_VA: 20/

## 2023-03-28 ASSESSMENT — TONOMETRY
OS_IOP_MMHG: 18
OD_IOP_MMHG: 18

## 2023-03-28 ASSESSMENT — AXIALLENGTH_DERIVED
OS_AL: 23.0894
OD_AL: 23.3169
OS_AL: 23.0894

## 2023-03-28 ASSESSMENT — KERATOMETRY
OD_K2POWER_DIOPTERS: 44.25
OS_AXISANGLE_DEGREES: 090
OS_K1POWER_DIOPTERS: 44.50
OS_K2POWER_DIOPTERS: 44.50
OD_AXISANGLE_DEGREES: 166
OD_K1POWER_DIOPTERS: 44.00

## 2023-03-28 ASSESSMENT — VISUAL ACUITY
OD_BCVA: 20/25+1
OS_BCVA: 20/20

## 2023-03-28 ASSESSMENT — CONFRONTATIONAL VISUAL FIELD TEST (CVF)
OS_FINDINGS: FULL
OD_FINDINGS: FULL

## 2023-03-28 ASSESSMENT — SPHEQUIV_DERIVED
OS_SPHEQUIV: 0.375
OS_SPHEQUIV: 0.375
OD_SPHEQUIV: 0.125

## 2023-03-28 ASSESSMENT — REFRACTION_AUTOREFRACTION
OD_SPHERE: +0.50
OS_AXIS: 074
OS_SPHERE: +0.75
OD_CYLINDER: -0.75
OD_AXIS: 102
OS_CYLINDER: -0.75

## 2023-03-28 ASSESSMENT — PACHYMETRY
OS_CT_CORRECTION: -1
OD_CT_UM: 540
OS_CT_UM: 560
OD_CT_CORRECTION: 0

## 2023-07-17 ENCOUNTER — APPOINTMENT (OUTPATIENT)
Dept: OBGYN | Facility: CLINIC | Age: 68
End: 2023-07-17
Payer: MEDICARE

## 2023-07-17 VITALS
BODY MASS INDEX: 22.09 KG/M2 | HEIGHT: 65 IN | DIASTOLIC BLOOD PRESSURE: 80 MMHG | SYSTOLIC BLOOD PRESSURE: 136 MMHG | WEIGHT: 132.6 LBS

## 2023-07-17 DIAGNOSIS — Z13.820 ENCOUNTER FOR SCREENING FOR OSTEOPOROSIS: ICD-10-CM

## 2023-07-17 DIAGNOSIS — Z12.4 ENCOUNTER FOR SCREENING FOR MALIGNANT NEOPLASM OF CERVIX: ICD-10-CM

## 2023-07-17 DIAGNOSIS — Z01.411 ENCOUNTER FOR GYNECOLOGICAL EXAMINATION (GENERAL) (ROUTINE) WITH ABNORMAL FINDINGS: ICD-10-CM

## 2023-07-17 PROCEDURE — G0101: CPT

## 2023-07-17 PROCEDURE — 36415 COLL VENOUS BLD VENIPUNCTURE: CPT

## 2023-07-17 RX ORDER — CITALOPRAM HYDROBROMIDE 20 MG/1
20 TABLET, FILM COATED ORAL
Qty: 90 | Refills: 0 | Status: DISCONTINUED | COMMUNITY
Start: 2022-05-18 | End: 2023-07-17

## 2023-07-17 RX ORDER — ASPIRIN 81 MG/1
81 TABLET, COATED ORAL
Qty: 90 | Refills: 0 | Status: DISCONTINUED | COMMUNITY
Start: 2021-06-23 | End: 2023-07-17

## 2023-07-17 RX ORDER — PREDNISONE 5 MG/1
5 TABLET ORAL
Qty: 90 | Refills: 0 | Status: DISCONTINUED | COMMUNITY
Start: 2022-05-16 | End: 2023-07-17

## 2023-07-17 RX ORDER — HYDROXYCHLOROQUINE SULFATE 200 MG/1
200 TABLET ORAL
Refills: 0 | Status: DISCONTINUED | COMMUNITY
End: 2023-07-17

## 2023-07-17 RX ORDER — CALCIUM CARBONATE/VITAMIN D3 600 MG-10
TABLET ORAL
Refills: 0 | Status: DISCONTINUED | COMMUNITY
End: 2023-07-17

## 2023-07-17 RX ORDER — CITALOPRAM HYDROBROMIDE 10 MG/1
10 TABLET, FILM COATED ORAL
Qty: 30 | Refills: 0 | Status: DISCONTINUED | COMMUNITY
Start: 2022-03-14 | End: 2023-07-17

## 2023-07-17 RX ORDER — CITALOPRAM HYDROBROMIDE 40 MG/1
40 TABLET, FILM COATED ORAL
Qty: 90 | Refills: 0 | Status: ACTIVE | COMMUNITY
Start: 2023-07-13

## 2023-07-17 NOTE — HISTORY OF PRESENT ILLNESS
[LMP unknown] : LMP unknown [N] : Patient is not sexually active [Y] : Positive pregnancy history [unknown] : Patient is unsure of the date of her LMP [Menarche Age: ____] : age at menarche was [unfilled] [Previously active] : previously active [TextBox_4] : Zuri is here for an annual exam. She is also c/o right breast pain x 2 weeks along with some bilateral yellow nipple discharge/crusting.\par \par she has had constipation for the last few weeks also.  She had planned to have a colonoscopy but had to cancel twice [Mammogramdate] : 01/30/23 [TextBox_19] : BR1 [PapSmeardate] : 07/13/22 [TextBox_31] : NEG [HPVDate] : 07/13/22 [TextBox_78] : NEG [PGHxTotal] : 2 [[facility]] : 2 [[facility]] : 2

## 2023-07-17 NOTE — PHYSICAL EXAM
[Chaperone Present] : A chaperone was present in the examining room during all aspects of the physical examination [Appropriately responsive] : appropriately responsive [Alert] : alert [No Acute Distress] : no acute distress [Oriented x3] : oriented x3 [Examination Of The Breasts] : a normal appearance [Breast Nipple Inversion] : inverted [No Masses] : no breast masses were palpable [Labia Majora] : normal [Labia Minora] : normal [Normal] : normal [Atrophy] : atrophy [No Bleeding] : There was no active vaginal bleeding [Absent] : absent [Uterine Adnexae] : non-palpable [FreeTextEntry1] : RAÚL Robert

## 2023-07-17 NOTE — PLAN
[FreeTextEntry1] : breast pain and nipple discharge\par - mammo/sono ordered\par - prolactin drawn today\par - pt to see the breast specialist due to severity of pain, and potential nipple d/c  \par - nipple d/c may be sebaceous fluid/crusting due to nipple inversion\par \par constipation\par - pt to see GI\par - try to avoid straining as this may exacerbate her pelvic floor issues\par \par cervical ca screening\par - vaginal pap obtained, pt with history of dysplasia\par \par DEXA ordered\par The benefits of adequate calcium and vitamin D3 intake combined with weight bearing exercise for bone protection were reviewed.\par \par

## 2023-07-19 LAB
CYTOLOGY CVX/VAG DOC THIN PREP: ABNORMAL
HPV HIGH+LOW RISK DNA PNL CVX: NOT DETECTED
PROLACTIN SERPL-MCNC: 18.4 NG/ML

## 2023-08-10 ENCOUNTER — APPOINTMENT (OUTPATIENT)
Dept: ULTRASOUND IMAGING | Facility: CLINIC | Age: 68
End: 2023-08-10
Payer: MEDICARE

## 2023-08-10 ENCOUNTER — RESULT REVIEW (OUTPATIENT)
Age: 68
End: 2023-08-10

## 2023-08-10 ENCOUNTER — APPOINTMENT (OUTPATIENT)
Dept: MAMMOGRAPHY | Facility: CLINIC | Age: 68
End: 2023-08-10
Payer: MEDICARE

## 2023-08-10 ENCOUNTER — OUTPATIENT (OUTPATIENT)
Dept: OUTPATIENT SERVICES | Facility: HOSPITAL | Age: 68
LOS: 1 days | End: 2023-08-10
Payer: MEDICARE

## 2023-08-10 DIAGNOSIS — N64.52 NIPPLE DISCHARGE: ICD-10-CM

## 2023-08-10 DIAGNOSIS — N64.4 MASTODYNIA: ICD-10-CM

## 2023-08-10 PROCEDURE — G0279: CPT

## 2023-08-10 PROCEDURE — 76641 ULTRASOUND BREAST COMPLETE: CPT | Mod: 26,50,GA

## 2023-08-10 PROCEDURE — 77066 DX MAMMO INCL CAD BI: CPT | Mod: 26

## 2023-08-10 PROCEDURE — G0279: CPT | Mod: 26

## 2023-08-10 PROCEDURE — 76641 ULTRASOUND BREAST COMPLETE: CPT

## 2023-08-10 PROCEDURE — 77066 DX MAMMO INCL CAD BI: CPT

## 2023-08-25 ENCOUNTER — NON-APPOINTMENT (OUTPATIENT)
Age: 68
End: 2023-08-25

## 2023-09-06 ENCOUNTER — APPOINTMENT (OUTPATIENT)
Dept: BREAST CENTER | Facility: CLINIC | Age: 68
End: 2023-09-06
Payer: MEDICARE

## 2023-09-06 VITALS
HEART RATE: 59 BPM | WEIGHT: 130 LBS | DIASTOLIC BLOOD PRESSURE: 67 MMHG | SYSTOLIC BLOOD PRESSURE: 124 MMHG | HEIGHT: 65 IN | BODY MASS INDEX: 21.66 KG/M2

## 2023-09-06 DIAGNOSIS — N64.52 NIPPLE DISCHARGE: ICD-10-CM

## 2023-09-06 DIAGNOSIS — Z13.79 ENCOUNTER FOR OTHER SCREENING FOR GENETIC AND CHROMOSOMAL ANOMALIES: ICD-10-CM

## 2023-09-06 DIAGNOSIS — Z80.0 FAMILY HISTORY OF MALIGNANT NEOPLASM OF DIGESTIVE ORGANS: ICD-10-CM

## 2023-09-06 DIAGNOSIS — N64.4 MASTODYNIA: ICD-10-CM

## 2023-09-06 DIAGNOSIS — Z80.3 FAMILY HISTORY OF MALIGNANT NEOPLASM OF BREAST: ICD-10-CM

## 2023-09-06 PROCEDURE — 99204 OFFICE O/P NEW MOD 45 MIN: CPT

## 2023-09-06 RX ORDER — BLOOD SUGAR DIAGNOSTIC
300 STRIP MISCELLANEOUS
Refills: 0 | Status: ACTIVE | COMMUNITY

## 2023-09-06 RX ORDER — LEVOTHYROXINE SODIUM 0.12 MG/1
125 TABLET ORAL
Refills: 0 | Status: DISCONTINUED | COMMUNITY
End: 2023-09-06

## 2023-09-06 RX ORDER — GREEN TEA LEAF EXTRACT 250 MG
333-133-5 CAPSULE ORAL
Refills: 0 | Status: DISCONTINUED | COMMUNITY
End: 2023-09-06

## 2023-09-06 RX ORDER — NORMAL SALT TABLETS 1 G/G
TABLET ORAL
Refills: 0 | Status: DISCONTINUED | COMMUNITY
End: 2023-09-06

## 2023-09-06 NOTE — CONSULT LETTER
[Dear  ___] : Dear ~ORION, [Consult Letter:] : I had the pleasure of evaluating your patient, [unfilled]. [Please see my note below.] : Please see my note below. [Consult Closing:] : Thank you very much for allowing me to participate in the care of this patient.  If you have any questions, please do not hesitate to contact me. [Sincerely,] : Sincerely, [FreeTextEntry3] : Di Rodgers MD FACS

## 2023-09-06 NOTE — PHYSICAL EXAM
[Normocephalic] : normocephalic [Atraumatic] : atraumatic [Sclera nonicteric] : sclera nonicteric [Supple] : supple [No Supraclavicular Adenopathy] : no supraclavicular adenopathy [No Cervical Adenopathy] : no cervical adenopathy [Clear to Auscultation Bilat] : clear to auscultation bilaterally [Normal Sinus Rhythm] : normal sinus rhythm [Examined in the supine and seated position] : examined in the supine and seated position [No dominant masses] : no dominant masses in right breast  [No dominant masses] : no dominant masses left breast [No Nipple Retraction] : no left nipple retraction [No Nipple Discharge] : no left nipple discharge [No Axillary Lymphadenopathy] : no left axillary lymphadenopathy [Soft] : abdomen soft [No Edema] : no edema [No Rashes] : no rashes [No Ulceration] : no ulceration [Bra Size: ___] : Bra Size: [unfilled] [de-identified] : Inverted nipple. Opaque yellow material; Guaiac negative. No liquid discharge [de-identified] : Inverted nipple. Opaque yellow material; Guaiac negative. No liquid discharge [de-identified] : Palpable node [de-identified] : Palpable node

## 2023-09-06 NOTE — PROCEDURE
[FreeTextEntry1] : Targeted L and R axillary US [FreeTextEntry2] : L and R palpable axillary nodes [FreeTextEntry3] : The patient was placed in a supine position, and the left and right axilla were scanned in both transverse and sagittal orientations. In the left axilla is a benign lymph node with preserved fatty hilum that is 1.33 x 0.76 x 1.04, and in the right axilla is a benign lymph node with preserved fatty hilum that is 1.68 x 0.68 . This correlates with the findings on physical exam. My impression is that these are benign reactive nodes, BIRADS 1.

## 2023-09-06 NOTE — DATA REVIEWED
[FreeTextEntry1] : I have independently reviewed the reports and the images.   B/l mammogram and US 8/10/23 - scattered fibroglandular density  - R retroareolar duct ectasia - R cyst - BIRADS 2

## 2023-09-06 NOTE — PAST MEDICAL HISTORY
[Postmenopausal] : The patient is postmenopausal [Menarche Age ____] : age at menarche was [unfilled] [Total Preg ___] : G[unfilled] [Live Births ___] : P[unfilled]  [Age At Live Birth ___] : Age at live birth: [unfilled] [FreeTextEntry8] : no

## 2023-09-06 NOTE — HISTORY OF PRESENT ILLNESS
[FreeTextEntry1] : Ms. Nicholson is a 68 year old woman who presents for a consultation for R breast pain and bilateral nipple discharge. She started having diffuse right breast pain of a sore achey quality about 3-4 months ago. It is improved when she is wearing a bra or if she takes a Tylenol.  Around that time, she also noticed yellow discharge from both nipples. Initially she saw it every day in the shower, and now she sees it about once a week. She has chronically inverted nipples and does squeeze and check them every other day. No lumps or skin changes. She has rheumatoid arthritis, and stopped methotrexate 6 months ago and plaquenil 3-4 months ago.  Her family history is significant for breast cancer in a paternal grandmother, and two paternal cousins at 49 and in her 30's. She does not know if anyone had genetic testing.

## 2023-09-18 ENCOUNTER — NON-APPOINTMENT (OUTPATIENT)
Age: 68
End: 2023-09-18

## 2023-10-04 ENCOUNTER — OFFICE (OUTPATIENT)
Dept: URBAN - METROPOLITAN AREA CLINIC 105 | Facility: CLINIC | Age: 68
Setting detail: OPHTHALMOLOGY
End: 2023-10-04
Payer: MEDICARE

## 2023-10-04 DIAGNOSIS — Z79.899: ICD-10-CM

## 2023-10-04 PROCEDURE — 92134 CPTRZ OPH DX IMG PST SGM RTA: CPT | Performed by: OPTOMETRIST

## 2023-10-04 PROCEDURE — 92083 EXTENDED VISUAL FIELD XM: CPT | Performed by: OPTOMETRIST

## 2023-10-04 PROCEDURE — 99213 OFFICE O/P EST LOW 20 MIN: CPT | Performed by: OPTOMETRIST

## 2023-10-04 ASSESSMENT — REFRACTION_CURRENTRX
OS_SPHERE: +0.50
OD_CYLINDER: SPHERE
OS_VPRISM_DIRECTION: SV
OD_OVR_VA: 20/
OD_VPRISM_DIRECTION: SV
OS_CYLINDER: -0.25
OD_CYLINDER: -0.25
OS_ADD: +2.25
OS_VPRISM_DIRECTION: PROGS
OS_OVR_VA: 20/
OD_OVR_VA: 20/
OD_ADD: +2.25
OS_OVR_VA: 20/
OS_CYLINDER: SPHERE
OD_VPRISM_DIRECTION: PROGS
OD_AXIS: 135
OS_AXIS: 080
OD_SPHERE: PLANO
OD_SPHERE: +2.00
OS_SPHERE: +2.00

## 2023-10-04 ASSESSMENT — REFRACTION_MANIFEST
OS_AXIS: 070
OD_SPHERE: PLANO
OD_ADD: +2.50
OD_AXIS: 140
OD_VA1: 20/20-1
OS_SPHERE: +0.50
OD_CYLINDER: -0.25
OS_ADD: +2.50
OS_CYLINDER: -0.25
OS_VA1: 20/20

## 2023-10-04 ASSESSMENT — REFRACTION_AUTOREFRACTION
OD_AXIS: 127
OD_SPHERE: 0.00
OS_CYLINDER: -0.75
OS_AXIS: 067
OS_SPHERE: +0.75
OD_CYLINDER: -0.25

## 2023-10-04 ASSESSMENT — KERATOMETRY
OD_K2POWER_DIOPTERS: 44.75
OD_K1POWER_DIOPTERS: 44.25
OS_K2POWER_DIOPTERS: 44.50
OD_AXISANGLE_DEGREES: 098
OS_AXISANGLE_DEGREES: 154
OS_K1POWER_DIOPTERS: 44.25

## 2023-10-04 ASSESSMENT — PACHYMETRY
OS_CT_CORRECTION: -1
OD_CT_UM: 540
OS_CT_UM: 560
OD_CT_CORRECTION: 0

## 2023-10-04 ASSESSMENT — VISUAL ACUITY
OS_BCVA: 20/20-
OD_BCVA: 20/25+

## 2023-10-04 ASSESSMENT — SPHEQUIV_DERIVED
OD_SPHEQUIV: -0.125
OS_SPHEQUIV: 0.375
OS_SPHEQUIV: 0.375

## 2023-10-04 ASSESSMENT — AXIALLENGTH_DERIVED
OD_AL: 23.2775
OS_AL: 23.1335
OS_AL: 23.1335

## 2023-10-04 ASSESSMENT — CONFRONTATIONAL VISUAL FIELD TEST (CVF)
OS_FINDINGS: FULL
OD_FINDINGS: FULL

## 2023-10-04 ASSESSMENT — TONOMETRY
OS_IOP_MMHG: 21
OD_IOP_MMHG: 20

## 2023-12-06 ENCOUNTER — APPOINTMENT (OUTPATIENT)
Dept: OBGYN | Facility: CLINIC | Age: 68
End: 2023-12-06
Payer: MEDICARE

## 2023-12-06 VITALS
HEIGHT: 65 IN | WEIGHT: 138 LBS | BODY MASS INDEX: 22.99 KG/M2 | SYSTOLIC BLOOD PRESSURE: 142 MMHG | DIASTOLIC BLOOD PRESSURE: 86 MMHG

## 2023-12-06 DIAGNOSIS — R10.2 PELVIC AND PERINEAL PAIN: ICD-10-CM

## 2023-12-06 DIAGNOSIS — R30.0 DYSURIA: ICD-10-CM

## 2023-12-06 LAB
APPEARANCE: CLEAR
BILIRUBIN URINE: NEGATIVE
BLOOD URINE: ABNORMAL
COLOR: YELLOW
GLUCOSE QUALITATIVE U: NEGATIVE
KETONES URINE: NEGATIVE
LEUKOCYTE ESTERASE URINE: ABNORMAL
NITRITE URINE: NEGATIVE
PH URINE: 7
PROTEIN URINE: NEGATIVE
SPECIFIC GRAVITY URINE: 1.01
UROBILINOGEN URINE: 0.2 (ref 0.2–?)

## 2023-12-06 PROCEDURE — 99213 OFFICE O/P EST LOW 20 MIN: CPT

## 2023-12-06 RX ORDER — CEPHALEXIN 500 MG/1
500 CAPSULE ORAL
Qty: 14 | Refills: 0 | Status: ACTIVE | COMMUNITY
Start: 2023-12-06 | End: 1900-01-01

## 2023-12-13 ENCOUNTER — ASOB RESULT (OUTPATIENT)
Age: 68
End: 2023-12-13

## 2023-12-13 ENCOUNTER — APPOINTMENT (OUTPATIENT)
Dept: ANTEPARTUM | Facility: CLINIC | Age: 68
End: 2023-12-13
Payer: MEDICARE

## 2023-12-13 ENCOUNTER — APPOINTMENT (OUTPATIENT)
Dept: OBGYN | Facility: CLINIC | Age: 68
End: 2023-12-13

## 2023-12-13 PROCEDURE — 76830 TRANSVAGINAL US NON-OB: CPT

## 2023-12-13 PROCEDURE — 76857 US EXAM PELVIC LIMITED: CPT | Mod: 59

## 2023-12-17 ENCOUNTER — NON-APPOINTMENT (OUTPATIENT)
Age: 68
End: 2023-12-17

## 2024-04-09 ENCOUNTER — OFFICE (OUTPATIENT)
Dept: URBAN - METROPOLITAN AREA CLINIC 105 | Facility: CLINIC | Age: 69
Setting detail: OPHTHALMOLOGY
End: 2024-04-09
Payer: MEDICARE

## 2024-04-09 ENCOUNTER — RX ONLY (RX ONLY)
Age: 69
End: 2024-04-09

## 2024-04-09 DIAGNOSIS — Z96.1: ICD-10-CM

## 2024-04-09 DIAGNOSIS — Z79.899: ICD-10-CM

## 2024-04-09 PROCEDURE — 92014 COMPRE OPH EXAM EST PT 1/>: CPT | Performed by: OPTOMETRIST

## 2024-04-09 PROCEDURE — 92134 CPTRZ OPH DX IMG PST SGM RTA: CPT | Performed by: OPTOMETRIST

## 2024-07-22 ENCOUNTER — APPOINTMENT (OUTPATIENT)
Dept: OBGYN | Facility: CLINIC | Age: 69
End: 2024-07-22

## 2024-07-24 ENCOUNTER — APPOINTMENT (OUTPATIENT)
Dept: OBGYN | Facility: CLINIC | Age: 69
End: 2024-07-24

## 2024-07-24 ENCOUNTER — NON-APPOINTMENT (OUTPATIENT)
Age: 69
End: 2024-07-24

## 2024-07-24 VITALS
WEIGHT: 140.38 LBS | BODY MASS INDEX: 23.39 KG/M2 | HEIGHT: 65 IN | DIASTOLIC BLOOD PRESSURE: 76 MMHG | SYSTOLIC BLOOD PRESSURE: 122 MMHG

## 2024-07-24 DIAGNOSIS — R92.30 DENSE BREASTS, UNSPECIFIED: ICD-10-CM

## 2024-07-24 DIAGNOSIS — Z12.39 ENCOUNTER FOR OTHER SCREENING FOR MALIGNANT NEOPLASM OF BREAST: ICD-10-CM

## 2024-07-24 DIAGNOSIS — Z01.419 ENCOUNTER FOR GYNECOLOGICAL EXAMINATION (GENERAL) (ROUTINE) W/OUT ABNORMAL FINDINGS: ICD-10-CM

## 2024-07-24 PROCEDURE — G0101: CPT

## 2024-08-03 NOTE — PLAN
[FreeTextEntry1] : no pap today per guidelines mammo/sono rx provided pt recommended to follow up with the breast surgeon as previously recommended for completeness.   Will obtain CT abdomen report and evaluate any need for a pelvic US.  Pt with prior hysterectomy and right oophorectomy.  (Addendum 8/3/24: CT report reviewed, no adnexal masses noted)  as for the concern about prolapse- exam normal today, discussed pelvic floor- with excessive straining she may experience some degree of prolapse but in the absence of problematic symptoms, no intervention necessary.

## 2024-08-03 NOTE — PHYSICAL EXAM
[Chaperone Present] : A chaperone was present in the examining room during all aspects of the physical examination [[id number]] : A chaperone was present during the pelvic exam. [Appropriately responsive] : appropriately responsive [Alert] : alert [No Acute Distress] : no acute distress [Soft] : soft [Non-tender] : non-tender [Non-distended] : non-distended [No HSM] : No HSM [No Lesions] : no lesions [No Mass] : no mass [Oriented x3] : oriented x3 [Examination Of The Breasts] : a normal appearance [No Masses] : no breast masses were palpable [Labia Majora] : normal [Labia Minora] : normal [Normal] : normal [Uterine Adnexae] : normal [FreeTextEntry2] : RAÚL Tijerina [FreeTextEntry8] : no pain, no masses noted

## 2024-08-03 NOTE — HISTORY OF PRESENT ILLNESS
[Patient reported PAP Smear was normal] : Patient reported PAP Smear was normal [postmenopausal] : postmenopausal [N] : Patient is not sexually active [Y] : Positive pregnancy history [Menarche Age: ____] : age at menarche was [unfilled] [No] : Patient does not have concerns regarding sex [Previously active] : previously active [Men] : men [TextBox_4] : Zuri is here for her annual exam. She was referred to the breast surgeon last year for c/o chronic right breast pain and nipple d/c. She had normal imaging and recommended to f/u at 6 mos- she hasnt yet made the follow up appointment but the nipple d/c and pain has resolved.  she is dealing with chronic constipation- following with GI. She is worried she is creating bladder prolapse when she strains. She denies any ЕКАТЕРИНА, urinary frequency, urgency, etc. She had a CT scan of her abdomen completed recently- told all normal except constipation/stool noted.  she is being worked up by neuro for some recent memory loss.   [Mammogramdate] : 08/10/2023 [TextBox_19] : br2 [BreastSonogramDate] : 08/10/2023 [TextBox_25] : br2 [PapSmeardate] : 07/17/2023 [BoneDensityDate] : 10/12/2021 [TextBox_37] : osteoporosis  [ColonoscopyDate] : 2024 [TextBox_43] : per pt wasn't clear  [GonorrheaDate] : n/a [ChlamydiaDate] : n/a [HPVDate] : 07/17/2023 [LMPDate] : 1988 [TextBox_78] : neg  [PGHxTotal] : 2 [[facility] [[facility]] : 2 [FreeTextEntry1] : 1988

## 2024-08-03 NOTE — HISTORY OF PRESENT ILLNESS
[Patient reported PAP Smear was normal] : Patient reported PAP Smear was normal [postmenopausal] : postmenopausal [N] : Patient is not sexually active [Y] : Positive pregnancy history [Menarche Age: ____] : age at menarche was [unfilled] [No] : Patient does not have concerns regarding sex [Previously active] : previously active [Men] : men [TextBox_4] : Zuri is here for her annual exam. She was referred to the breast surgeon last year for c/o chronic right breast pain and nipple d/c. She had normal imaging and recommended to f/u at 6 mos- she hasnt yet made the follow up appointment but the nipple d/c and pain has resolved.  she is dealing with chronic constipation- following with GI. She is worried she is creating bladder prolapse when she strains. She denies any ЕКАТЕРИНА, urinary frequency, urgency, etc. She had a CT scan of her abdomen completed recently- told all normal except constipation/stool noted.  she is being worked up by neuro for some recent memory loss.   [Mammogramdate] : 08/10/2023 [TextBox_19] : br2 [BreastSonogramDate] : 08/10/2023 [TextBox_25] : br2 [PapSmeardate] : 07/17/2023 [BoneDensityDate] : 10/12/2021 [TextBox_37] : osteoporosis  [ColonoscopyDate] : 2024 [TextBox_43] : per pt wasn't clear  [GonorrheaDate] : n/a [ChlamydiaDate] : n/a [HPVDate] : 07/17/2023 [TextBox_78] : neg  [LMPDate] : 1988 [PGHxTotal] : 2 [[facility]] : 2 [[facility]] : 2 [FreeTextEntry1] : 1989

## 2024-08-03 NOTE — REASON FOR VISIT
[Annual] : an annual visit. [FreeTextEntry2] : pt sates having bail movement problems. pt states feels like blockage

## 2024-08-03 NOTE — PHYSICAL EXAM
[Chaperone Present] : A chaperone was present in the examining room during all aspects of the physical examination [[id number]] : A chaperone was present during the pelvic exam. [Appropriately responsive] : appropriately responsive [Alert] : alert [No Acute Distress] : no acute distress [Soft] : soft [Non-tender] : non-tender [Non-distended] : non-distended [No HSM] : No HSM [No Lesions] : no lesions [No Mass] : no mass [Oriented x3] : oriented x3 [Examination Of The Breasts] : a normal appearance [No Masses] : no breast masses were palpable [Labia Majora] : normal [Labia Minora] : normal [Normal] : normal [Uterine Adnexae] : normal [FreeTextEntry2] : RAÚL Tijerina [FreeTextEntry8] : no pain, no masses noted

## 2024-08-13 ENCOUNTER — APPOINTMENT (OUTPATIENT)
Dept: MAMMOGRAPHY | Facility: CLINIC | Age: 69
End: 2024-08-13

## 2024-08-13 ENCOUNTER — RESULT REVIEW (OUTPATIENT)
Age: 69
End: 2024-08-13

## 2024-08-13 ENCOUNTER — APPOINTMENT (OUTPATIENT)
Dept: ULTRASOUND IMAGING | Facility: CLINIC | Age: 69
End: 2024-08-13

## 2024-08-13 PROCEDURE — 77063 BREAST TOMOSYNTHESIS BI: CPT | Mod: 26

## 2024-08-13 PROCEDURE — 77067 SCR MAMMO BI INCL CAD: CPT | Mod: 26

## 2024-08-13 PROCEDURE — 76641 ULTRASOUND BREAST COMPLETE: CPT | Mod: 26,50,GY

## 2024-11-01 ENCOUNTER — NON-APPOINTMENT (OUTPATIENT)
Age: 69
End: 2024-11-01

## 2024-11-01 DIAGNOSIS — E87.1 HYPO-OSMOLALITY AND HYPONATREMIA: ICD-10-CM

## 2024-11-01 DIAGNOSIS — E78.5 HYPERLIPIDEMIA, UNSPECIFIED: ICD-10-CM

## 2024-11-16 ENCOUNTER — OFFICE (OUTPATIENT)
Dept: URBAN - METROPOLITAN AREA CLINIC 105 | Facility: CLINIC | Age: 69
Setting detail: OPHTHALMOLOGY
End: 2024-11-16
Payer: MEDICARE

## 2024-11-16 DIAGNOSIS — D31.32: ICD-10-CM

## 2024-11-16 PROCEDURE — 92014 COMPRE OPH EXAM EST PT 1/>: CPT | Performed by: OPTOMETRIST

## 2024-11-16 PROCEDURE — 92083 EXTENDED VISUAL FIELD XM: CPT | Performed by: OPTOMETRIST

## 2024-11-16 PROCEDURE — 92134 CPTRZ OPH DX IMG PST SGM RTA: CPT | Performed by: OPTOMETRIST

## 2024-11-16 ASSESSMENT — REFRACTION_CURRENTRX
OD_OVR_VA: 20/
OD_ADD: +2.25
OD_SPHERE: +2.00
OS_OVR_VA: 20/
OD_AXIS: 135
OD_CYLINDER: -0.25
OD_CYLINDER: SPHERE
OS_VPRISM_DIRECTION: PROGS
OS_SPHERE: +2.00
OS_ADD: +2.25
OD_VPRISM_DIRECTION: SV
OD_SPHERE: PLANO
OD_OVR_VA: 20/
OD_VPRISM_DIRECTION: PROGS
OS_AXIS: 080
OS_OVR_VA: 20/
OS_CYLINDER: SPHERE
OS_VPRISM_DIRECTION: SV
OS_CYLINDER: -0.25
OS_SPHERE: +0.50

## 2024-11-16 ASSESSMENT — REFRACTION_MANIFEST
OD_VA1: 20/20-1
OD_CYLINDER: -0.25
OD_SPHERE: PLANO
OS_AXIS: 070
OD_AXIS: 140
OS_SPHERE: +0.50
OS_ADD: +2.50
OS_VA1: 20/20
OD_ADD: +2.50
OS_CYLINDER: -0.25

## 2024-11-16 ASSESSMENT — PACHYMETRY
OS_CT_UM: 560
OD_CT_CORRECTION: 0
OS_CT_CORRECTION: -1
OD_CT_UM: 540

## 2024-11-16 ASSESSMENT — KERATOMETRY
OS_AXISANGLE_DEGREES: 070
OD_K2POWER_DIOPTERS: 44.50
OS_K1POWER_DIOPTERS: 44.75
OD_AXISANGLE_DEGREES: 076
OD_K1POWER_DIOPTERS: 44.25
OS_K2POWER_DIOPTERS: 45.25

## 2024-11-16 ASSESSMENT — REFRACTION_AUTOREFRACTION
OS_CYLINDER: -0.25
OD_AXIS: 114
OD_CYLINDER: -0.50
OS_AXIS: 088
OS_SPHERE: +0.50
OD_SPHERE: 0.00

## 2024-11-16 ASSESSMENT — TONOMETRY
OD_IOP_MMHG: 21
OS_IOP_MMHG: 21

## 2024-11-16 ASSESSMENT — CONFRONTATIONAL VISUAL FIELD TEST (CVF)
OS_FINDINGS: FULL
OD_FINDINGS: FULL

## 2024-11-16 ASSESSMENT — VISUAL ACUITY
OD_BCVA: 20/20-
OS_BCVA: 20/20

## 2025-06-03 ENCOUNTER — OFFICE (OUTPATIENT)
Dept: URBAN - METROPOLITAN AREA CLINIC 105 | Facility: CLINIC | Age: 70
Setting detail: OPHTHALMOLOGY
End: 2025-06-03
Payer: MEDICARE

## 2025-06-03 DIAGNOSIS — D31.32: ICD-10-CM

## 2025-06-03 DIAGNOSIS — Z79.899: ICD-10-CM

## 2025-06-03 DIAGNOSIS — Z96.1: ICD-10-CM

## 2025-06-03 PROCEDURE — 92014 COMPRE OPH EXAM EST PT 1/>: CPT | Performed by: OPTOMETRIST

## 2025-06-03 PROCEDURE — 92134 CPTRZ OPH DX IMG PST SGM RTA: CPT | Performed by: OPTOMETRIST

## 2025-06-03 ASSESSMENT — PACHYMETRY
OD_CT_CORRECTION: 0
OS_CT_CORRECTION: -1
OS_CT_UM: 560
OD_CT_UM: 540

## 2025-06-03 ASSESSMENT — KERATOMETRY
OS_K2POWER_DIOPTERS: 44.75
OD_K2POWER_DIOPTERS: 44.75
OD_K1POWER_DIOPTERS: 44.25
OD_AXISANGLE_DEGREES: 122
OS_K1POWER_DIOPTERS: 44.25
OS_AXISANGLE_DEGREES: 038

## 2025-06-03 ASSESSMENT — REFRACTION_CURRENTRX
OD_VPRISM_DIRECTION: SV
OD_CYLINDER: -0.25
OD_AXIS: 135
OD_VPRISM_DIRECTION: PROGS
OS_OVR_VA: 20/
OD_SPHERE: +2.00
OS_VPRISM_DIRECTION: PROGS
OS_SPHERE: +2.00
OS_VPRISM_DIRECTION: SV
OD_OVR_VA: 20/
OS_OVR_VA: 20/
OD_OVR_VA: 20/
OS_AXIS: 080
OD_ADD: +2.25
OS_CYLINDER: SPHERE
OS_SPHERE: +0.50
OS_ADD: +2.25
OD_SPHERE: PLANO
OS_CYLINDER: -0.25
OD_CYLINDER: SPHERE

## 2025-06-03 ASSESSMENT — VISUAL ACUITY
OD_BCVA: 20/20
OS_BCVA: 20/20-

## 2025-06-03 ASSESSMENT — REFRACTION_AUTOREFRACTION
OS_CYLINDER: -0.50
OD_CYLINDER: -0.50
OS_SPHERE: +0.50
OD_SPHERE: +0.50
OD_AXIS: 100
OS_AXIS: 070

## 2025-06-03 ASSESSMENT — REFRACTION_MANIFEST
OD_CYLINDER: -0.25
OS_ADD: +2.50
OS_VA1: 20/20
OD_SPHERE: PLANO
OS_SPHERE: +0.50
OD_VA1: 20/20-1
OS_CYLINDER: -0.25
OD_ADD: +2.50
OS_AXIS: 070
OD_AXIS: 140

## 2025-06-03 ASSESSMENT — CONFRONTATIONAL VISUAL FIELD TEST (CVF)
OD_FINDINGS: FULL
OS_FINDINGS: FULL

## 2025-06-03 ASSESSMENT — TONOMETRY
OD_IOP_MMHG: 18
OS_IOP_MMHG: 20
OS_IOP_MMHG: 17